# Patient Record
Sex: FEMALE | Race: WHITE | URBAN - METROPOLITAN AREA
[De-identification: names, ages, dates, MRNs, and addresses within clinical notes are randomized per-mention and may not be internally consistent; named-entity substitution may affect disease eponyms.]

---

## 2019-07-30 ENCOUNTER — TRANSFERRED RECORDS (OUTPATIENT)
Dept: HEALTH INFORMATION MANAGEMENT | Facility: CLINIC | Age: 44
End: 2019-07-30

## 2019-08-01 ENCOUNTER — TRANSFERRED RECORDS (OUTPATIENT)
Dept: HEALTH INFORMATION MANAGEMENT | Facility: CLINIC | Age: 44
End: 2019-08-01

## 2019-09-09 ENCOUNTER — TRANSFERRED RECORDS (OUTPATIENT)
Dept: HEALTH INFORMATION MANAGEMENT | Facility: CLINIC | Age: 44
End: 2019-09-09

## 2019-10-07 ENCOUNTER — MEDICAL CORRESPONDENCE (OUTPATIENT)
Dept: HEALTH INFORMATION MANAGEMENT | Facility: CLINIC | Age: 44
End: 2019-10-07

## 2019-10-07 ENCOUNTER — TRANSFERRED RECORDS (OUTPATIENT)
Dept: HEALTH INFORMATION MANAGEMENT | Facility: CLINIC | Age: 44
End: 2019-10-07

## 2019-10-08 ENCOUNTER — TELEPHONE (OUTPATIENT)
Dept: OPHTHALMOLOGY | Facility: CLINIC | Age: 44
End: 2019-10-08

## 2019-10-08 NOTE — TELEPHONE ENCOUNTER
Pt states unable to come in tomorrow for evaluation with Dr Goodson    Rescheduled for next Friday 10-18-19 and pt seemed satisfied with date/time/location at [facility]  Waqas Cruz RN RN 3:58 PM 10/10/19    --        Left message at 0855 with direct number to call and confirm appt for tomorrow with Dr. Hamzah Cruz RN RN 8:55 AM 10/10/19    --    [phone]  Left message with direct number to review scheduling-- tentatively scheduled this Friday with Dr. Goodson (neuro-ophthalmology fellow)  Asked to call to confirm vs rescheduling    Notes in system  Headaches LP 25.5 opening pressure consistent with pseudotumor    No recent eye exam to note papilledema  Pt referred to neuro-ophthalmology for evaluation    Pt on topamax and has improvement with headache per note    Waqas Cruz RN RN 11:29 AM 10/09/19           [facility]    Phone Message    May a detailed message be left on voicemail: no    Reason for Call: Other: Patient referred for Pseudotumor Cerebri - needs eye exam including visual fields, ref by Kenneth Puente [facility]     Action Taken: Message routed to:  [facility] ([facility]): Ophth

## 2019-10-17 DIAGNOSIS — H53.10 SUBJECTIVE VISION DISTURBANCE: Primary | ICD-10-CM

## 2019-10-18 ENCOUNTER — OFFICE VISIT (OUTPATIENT)
Dept: OPHTHALMOLOGY | Facility: CLINIC | Age: 44
End: 2019-10-18
Attending: OPHTHALMOLOGY
Payer: COMMERCIAL

## 2019-10-18 DIAGNOSIS — H52.13 MYOPIA OF BOTH EYES: Primary | ICD-10-CM

## 2019-10-18 DIAGNOSIS — H53.10 SUBJECTIVE VISION DISTURBANCE: ICD-10-CM

## 2019-10-18 DIAGNOSIS — H53.40 VISUAL FIELD DEFECT: ICD-10-CM

## 2019-10-18 PROCEDURE — 92083 EXTENDED VISUAL FIELD XM: CPT | Mod: ZF | Performed by: STUDENT IN AN ORGANIZED HEALTH CARE EDUCATION/TRAINING PROGRAM

## 2019-10-18 PROCEDURE — 92133 CPTRZD OPH DX IMG PST SGM ON: CPT | Mod: ZF | Performed by: STUDENT IN AN ORGANIZED HEALTH CARE EDUCATION/TRAINING PROGRAM

## 2019-10-18 PROCEDURE — G0463 HOSPITAL OUTPT CLINIC VISIT: HCPCS | Mod: ZF | Performed by: TECHNICIAN/TECHNOLOGIST

## 2019-10-18 RX ORDER — ACETAZOLAMIDE 250 MG/1
250 TABLET ORAL 2 TIMES DAILY
COMMUNITY

## 2019-10-18 RX ORDER — VERAPAMIL HYDROCHLORIDE 40 MG/1
40 TABLET ORAL 3 TIMES DAILY
COMMUNITY

## 2019-10-18 ASSESSMENT — REFRACTION_WEARINGRX
OD_ADD: +1.25
OS_CYLINDER: +1.00
OS_AXIS: 022
OD_CYLINDER: +0.75
OD_SPHERE: -4.00
SPECS_TYPE: PAL
OD_AXIS: 155
OS_ADD: +1.25
OS_SPHERE: -4.00

## 2019-10-18 ASSESSMENT — EXTERNAL EXAM - LEFT EYE: OS_EXAM: NORMAL

## 2019-10-18 ASSESSMENT — VISUAL ACUITY
OD_CC: 20/20
CORRECTION_TYPE: GLASSES
METHOD: SNELLEN - LINEAR
OD_CC+: -3
OS_CC: 20/20
OS_CC+: -2

## 2019-10-18 ASSESSMENT — CONF VISUAL FIELD
METHOD: COUNTING FINGERS
OS_NORMAL: 1
OD_NORMAL: 1

## 2019-10-18 ASSESSMENT — TONOMETRY
OD_IOP_MMHG: 15
IOP_METHOD: ICARE
OS_IOP_MMHG: 15

## 2019-10-18 ASSESSMENT — CUP TO DISC RATIO
OS_RATIO: 0.3
OD_RATIO: 0.3

## 2019-10-18 ASSESSMENT — SLIT LAMP EXAM - LIDS
COMMENTS: NORMAL
COMMENTS: NORMAL

## 2019-10-18 ASSESSMENT — EXTERNAL EXAM - RIGHT EYE: OD_EXAM: NORMAL

## 2019-10-18 NOTE — NURSING NOTE
"Chief Complaint(s) and History of Present Illness(es)     New Patient     In both eyes (Consult for pseudotumor cerebri).  Associated symptoms include headache.              Comments     Notes in system, telephone calls with VISHAL Alan:   Headaches, LP 25.5 opening pressure consistent with pseudotumor. No recent eye exam to note papilledema. Patient referred to neuro-ophthalmology for evaluation.  Patient on topamax and has improvement with headache.  +MRI brain done as well.   Waqas Cruz RN RN 11:29 AM 10/09/19    Patient states blurry vision each eye. Patient states no longer on topamax, currently on Verapamil 40mg TID: Diamox 260 mg twice day-->helping with the headaches.   No pulsatile tinnitus. No double vision. Reports \"kaleidoscope vision.\"  Lost weight with taking meds, currently weigh 168#.     Diabetes, diagnosed 20 years, taking metformin.    JASON Cox 10/18/2019 2:32 PM                  "

## 2020-01-23 DIAGNOSIS — H53.10 SUBJECTIVE VISION DISTURBANCE: Primary | ICD-10-CM

## 2020-12-10 NOTE — PROGRESS NOTES
Assessment & Plan     Joana Harkins is a 44 year old female with the following diagnoses:   1. Subjective vision disturbance      Patient is a 44 year old female sent by Dr. Puente for evaluation of papilledema. She was diagnosed on October 7th by Dr. Peunte with Idiopathic Intracranial Hypertension. She reports that she has had headaches since June 2019 that have improved since starting Diamox. She has worked with Dr. Puente on finding a suitable headache regimen. She is currently taking Verapamil, gabapentin, benadryl, and ibuprofen with caffeine. She is not taking Topamax presently. She reports that she has kaleidoscope aura nearly every time she has a headache and can see through the aura. Her peripheral vision did get blurry on one occasion. She reports light sensitivity with her headaches.     She had lumbar puncture on 9/9/19 at [place] in [place] which showed an opening pressure of 25.5 and had a MRI brain on 8/1/19. She is currently taking 250 mg Diamox twice daily and started on 10/7/19. She reports that she has lost 8 pounds over the past 2 months and currently weighs 168 pounds. Denies diplopia, pulsatile tinnitus, change in vision, use of acne medications, no recent steroid use or withdrawal.    POH: Refractive error  PMH: Tubal ligation, Type 2 Diabetes on metformin  FH: Negative for macular degeneration, glaucoma    Visual acuity is 20/20 both eyes. Intraocular pressure is normal. Pupils normal without afferent pupillary defect. Color plates full both eyes. Confrontational visual fields full both eyes. Motility full both eyes. Slit lamp exam unremarkable. Dilated fundus exam without optic disc edema in either eye.    Visual fields are reliable in both eyes and show superior arcuate defects both eyes. OCT rNFL shows borderline temporal thinning in both eyes with general thickness of 84 right eye and 79 left eye without evidence of edema    It is my impression that Joana does not have  What Is The Reason For Today's Visit?: Full Body Skin Examination with No Concerns What Is The Reason For Today's Visit? (Being Monitored For X): concerning skin lesions on an annual basis papilledema at this time. She may have had papilledema in the past, but there is not optic disc edema today. On my personal review of the MRI from August I did not see evidence of increased intracranial pressure. She does not have thickening of her nerve fiber layer on OCT. She has superior arcuate visual field defects in both eyes that are likely testing artifact. No evidence of diabetic retinopathy on examination. I will have her return to clinic in 3 months to repeat visual field testing. Discussed that I would not keep her on Diamox as she does not have optic disc edema, but that given her headaches have improved she will discuss continuing the medication with Dr. Puente.        Attending Physician Attestation: Complete documentation of historical and exam elements from today's encounter can be found in the full encounter summary report (not reduplicated in this progress note). I personally obtained the chief complaint(s) and history of present illness. I confirmed and edited as necessary the review of systems, past medical/surgical history, family history, social history, and examination findings as documented by other; and I examined the patient myself. I personally reviewed the relevant tests, images, and reports as documented aboe. I formulated and edited as necessary the assessment and plan and discussed the findings and management plan with the patient and family. -Gerry Goodson MD-    Gerry Goodson MD  Ophthalmology, PGY-5  Neuro-Ophthalmology Fellow